# Patient Record
Sex: MALE | Race: WHITE | NOT HISPANIC OR LATINO | Employment: OTHER | URBAN - METROPOLITAN AREA
[De-identification: names, ages, dates, MRNs, and addresses within clinical notes are randomized per-mention and may not be internally consistent; named-entity substitution may affect disease eponyms.]

---

## 2024-02-06 ENCOUNTER — APPOINTMENT (OUTPATIENT)
Dept: PULMONOLOGY | Facility: CLINIC | Age: 69
End: 2024-02-06
Payer: COMMERCIAL

## 2024-03-12 NOTE — PROGRESS NOTES
Patient: Faisal Jose    [id number]  : 1955 -- AGE 68 y.o.    Provider: MELE Herman     Location [facility]   Service Date: 3/13/2024       Department of Medicine  Division of Pulmonary, Critical Care, and Sleep Medicine           [facility]  New Visit Note    HISTORY OF PRESENT ILLNESS     PCP: Dr. Trino Mckeon  Cardiology: Dr. Goldman ([facility]; [facility])    HISTORY OF PRESENT ILLNESS   Faisal oJse is a 68 y.o. male who presents to a [facility] for an evaluation with concerns of JURADO.  I have independently interviewed and examined the patient in the office and reviewed available records.    Current History    On today's visit, the patient reports no SOB at rest. Does have JURADO; noticeable going up stairs. Intermittent when walking on a flat surface. Symptoms began about 3 years ago. The other day, walked about 5 miles and felt pretty good. The day prior, the symptoms were more present. Symptoms seem to come/go. Does not personally have an albuterol HFA; but has been using his partners; however - has not tried it yet. Infrequent cough; typically dry. No recent fever, chills, sweats. No hemoptysis. States he has put on weight gradually; over the past 2 months feels like he's gained about 10 lbs. Questionable orthopnea. Lots of wheezing. Lots of lower leg swelling. States he did an echo at [facility] about 2 years ago. Cardiologist and PCP knows about the leg swelling. Was advised lasix in the past. States he used to be a lot more active in the past. Denies chronic runny nose. Takes prilosec daily; GERD typically well controlled. It can flare at times. The past week, has had 3 flares of GERD. Does not see GI and has never had EGD. Denies CP, palpitations. No inhalers therapy in the past.     Denies premature birth. No childhood pulmonary issues growing up. No pulmonary hospitalizations. Has never  been on home oxygen therapy before.    Has never completed a sleep study before. States he was advised to get one completed by his cardiologist. He does snore. Witnessed apneic events. No AM headaches. States he does not sleep well; maybe 4 hrs/day. Can doze off very easy.    CAT Today: 21  ACT Today: 14  ESS Today: 4    Prior Notes & History       REVIEW OF SYSTEMS     REVIEW OF SYSTEMS  Review of Systems    ALLERGIES AND MEDICATIONS     ALLERGIES  No Known Allergies    MEDICATIONS  Current Outpatient Medications   Medication Sig Dispense Refill    amLODIPine (Norvasc) 5 mg tablet Take 1 tablet (5 mg) by mouth once daily.      atorvastatin (Lipitor) 20 mg tablet Take 1 tablet (20 mg) by mouth once daily.      metoprolol succinate XL (Toprol-XL) 50 mg 24 hr tablet Take 1 tablet (50 mg) by mouth once daily.      omeprazole (PriLOSEC) 20 mg DR capsule Take 1 capsule (20 mg) by mouth once daily.       No current facility-administered medications for this visit.       PAST HISTORY     PAST MEDICAL HISTORY  He  has no past medical history on file.    PAST SURGICAL HISTORY  Past Surgical History:   Procedure Laterality Date    OTHER SURGICAL HISTORY  11/02/2021    Wrist surgery       IMMUNIZATION HISTORY    There is no immunization history on file for this patient.    SOCIAL HISTORY  He  reports that he quit smoking about 31 years ago. His smoking use included cigarettes. He started smoking about 55 years ago. He has a 52.50 pack-year smoking history. He has quit using smokeless tobacco. He reports that he does not currently use alcohol. He reports that he does not currently use drugs.     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 Worked in a mine (underground for 4.5 years, above ground 3 years).  Currently works as: retired 2019  DOES/DOES NOT: does not have known exposure to asbestos, silica, beryllium or inhaled metals.  DOES/DOES NOT:  "does not have exposure to birds or exotic animals.    FAMILY HISTORY  No family history on file.  DOES/DOES NOT: does have a family history of pulmonary disease.  Brother: COPD/COVID ()  Father: asthma  Brother: asthma  Mother: asthma  Son: asthma  Grandaughter: asthma  DOES/DOES NOT: does have a family history of cancer.  Mother; lung CA and esophageal CA  DOES/DOES NOT: does not have a family history of autoimmune disorders.    PHYSICAL EXAM     VITAL SIGNS: /68   Pulse 59   Ht 1.753 m (5' 9\")   Wt 127 kg (279 lb)   SpO2 95%   BMI 41.20 kg/m²      PREVIOUS WEIGHTS:  Wt Readings from Last 3 Encounters:   24 127 kg (279 lb)   23 121 kg (267 lb 12.8 oz)   23 122 kg (270 lb)       Physical Exam  Vitals reviewed.   Constitutional:       General: He is not in acute distress.     Appearance: Normal appearance. He is obese. He is not ill-appearing or toxic-appearing.   HENT:      Head: Normocephalic.      Nose: No rhinorrhea.   Cardiovascular:      Rate and Rhythm: Normal rate and regular rhythm.      Heart sounds: Normal heart sounds.   Pulmonary:      Effort: Pulmonary effort is normal. No respiratory distress.      Breath sounds: Normal breath sounds. No stridor. No wheezing, rhonchi or rales.      Comments: Generalized diminished lung sound all over; likely 2/2 to body habitus. No adventitious lung sounds noted  Abdominal:      General: Abdomen is flat.   Musculoskeletal:         General: Normal range of motion.      Right lower leg: Edema present.      Left lower leg: Edema present.      Comments: +2 pitting edema BLE   Skin:     General: Skin is warm and dry.      Nails: There is no clubbing.   Neurological:      General: No focal deficit present.      Mental Status: He is alert and oriented to person, place, and time.   Psychiatric:         Mood and Affect: Mood normal.         Behavior: Behavior normal.         Judgment: Judgment normal.         RESULTS/DATA     Pulmonary " "Function Test Results   PFT  - 12/4/23: ([facility]) FEV1/FVC: 79, FEV1: 2.51 (76%), FVC: 3.19 (73%), no BD response, [id number]: 87% (23% change after BD admin). No lung volumes. No diffusion capacity.    Chest Radiograph   CXR  - 12/4/23: IMPRESSION: No acute radiographic abnormality.     Chest CT Scan     No results found for this or any previous visit from the past 365 days.      Echocardiogram & Cardiac Studies     No results found for this or any previous visit from the past 365 days.       Labwork & Pathology   Complete Blood Count  No results found for: \"WBC\", \"HGB\", \"HCT\", \"MCV\", \"PLT\"    Peripheral Eosinophil Count:   No results found for: \"EOSABS\"    Serum Immunoglobulin E:    No results found for: \"IGE\"     Metabolic Parameters  No results found for: \"NA\", \"K\", \"CL\", \"CO2\", \"ANIONGAP\", \"BUN\", \"CREATININE\", \"GFRMALE\", \"GFRF\", \"GLUCOSE\", \"CALCIUM\", \"PHOS\", \"ZINC\", \"AST\", \"ALT\"    Bronchoscopy & Pathology/Cultures       ASSESSMENT/PLAN     Mr. Jose is a 68 y.o. male; was referred to the [facility] for evaluation of JURADO.    Problem List and Orders  Diagnoses and all orders for this visit:  Moderate persistent asthma without complication  -     fluticasone furoate-vilanteroL (Breo Ellipta) 100-25 mcg/dose inhaler; Inhale 1 puff once daily. Rinse mouth with water after use to reduce aftertaste and incidence of candidiasis. Do not swallow.  -     albuterol 90 mcg/actuation inhaler; Inhale 2 puffs every 4 hours if needed for wheezing or shortness of breath (You may also use this 10-15 minutes prior to exertional activity as needed).  Restrictive lung disease  -     CT chest wo IV contrast; Future  Former cigarette smoker  -     CT chest wo IV contrast; Future  Gastroesophageal reflux disease, unspecified whether esophagitis present  Suspected sleep apnea  -     Referral to Adult Sleep Medicine; Future      Assessment and Plan / Recommendations:  Asthma: Reports that his JURADO " symptoms began approximately 3 years ago.  Denies shortness of breath at rest but will experience JURADO with various exertional activities.  Symptoms seem to be intermittent and can come and go.  The other day he was able to go for a 5 mile walk in the woods and had no issues with the day previous could barely go for a short distance walk around his camper without feeling winded.  Has never had inhaler therapy before.  Recently completed a PFT study at [facility] on 12/4/2023; no clear obstruction seen but there was suggestion of restriction.  No positive bronchodilator response however his small airways were reactive after albuterol administration.  No lung volumes or diffusion capacity was done on this study.  He does admit to an intermittent dry cough and also experiences frequent wheezing.  -Start Breo 100; 1 inhalation once a day.  Rinse mouth after use.  -Start albuterol HFA as needed  -Very strong family history of asthma  -Between his presenting symptoms and PFT findings I am fairly confident that this is asthma.  Will see how he responds to the above treatments    2.  Lung restriction: PFT from 12/4/2023 was suggestive of a restrictive process however there were no lung volumes performed and no acute diffusion capacity was performed.  Patient is obese and has a BMI of 41.  His lung restriction is likely extrathoracic based on his body habitus however he also has a lots of occupational inhalant exposures over a long period of time.  -Ordering baseline chest CT to assess his lung anatomy    3.  Former smoker: Smoked anywhere from 2-2.5 PPD x21 years.  Quit in 1993.  -He does not qualify for lung cancer screening protocol given length of quit  -No chest CT ever on record  -Will assess for any nodules on the upcoming chest CT    4.  GERD: Has been taking omeprazole 20 mg daily for the past year and feels like it typically helps his acid reflux symptoms.  Lately he has been having an increased flaring of  symptoms.  Discussed dietary habits and lifestyle changes to help prevent increased flares.  -Continue omeprazole 20 mg daily; can also consider upping the dose to 40 mg a day (20/20 split)  -Has never seen a GI and has never had an EGD    5. Suspected BRENNAN: Has partner states that he snores excessively and has also witnessed multiple apneic events.  Patient does report daytime fatigue as well as dozing off easily throughout the day.  He does admit that he has poor sleep quality and will usually get maybe 4 hours of sleep at night.  -Referral to sleep medicine; Darlene Jackson CNP    RTC 3 months

## 2024-03-13 ENCOUNTER — OFFICE VISIT (OUTPATIENT)
Dept: PULMONOLOGY | Facility: CLINIC | Age: 69
End: 2024-03-13
Payer: COMMERCIAL

## 2024-03-13 VITALS
OXYGEN SATURATION: 95 % | HEIGHT: 69 IN | BODY MASS INDEX: 41.32 KG/M2 | DIASTOLIC BLOOD PRESSURE: 68 MMHG | HEART RATE: 59 BPM | WEIGHT: 279 LBS | SYSTOLIC BLOOD PRESSURE: 134 MMHG

## 2024-03-13 DIAGNOSIS — J45.40 MODERATE PERSISTENT ASTHMA WITHOUT COMPLICATION (HHS-HCC): Primary | ICD-10-CM

## 2024-03-13 DIAGNOSIS — K21.9 GASTROESOPHAGEAL REFLUX DISEASE, UNSPECIFIED WHETHER ESOPHAGITIS PRESENT: ICD-10-CM

## 2024-03-13 DIAGNOSIS — J98.4 RESTRICTIVE LUNG DISEASE: ICD-10-CM

## 2024-03-13 DIAGNOSIS — Z87.891 FORMER CIGARETTE SMOKER: ICD-10-CM

## 2024-03-13 DIAGNOSIS — R29.818 SUSPECTED SLEEP APNEA: ICD-10-CM

## 2024-03-13 PROCEDURE — 1126F AMNT PAIN NOTED NONE PRSNT: CPT | Performed by: NURSE PRACTITIONER

## 2024-03-13 PROCEDURE — 1159F MED LIST DOCD IN RCRD: CPT | Performed by: NURSE PRACTITIONER

## 2024-03-13 PROCEDURE — 99214 OFFICE O/P EST MOD 30 MIN: CPT | Performed by: NURSE PRACTITIONER

## 2024-03-13 PROCEDURE — 3008F BODY MASS INDEX DOCD: CPT | Performed by: NURSE PRACTITIONER

## 2024-03-13 PROCEDURE — 99204 OFFICE O/P NEW MOD 45 MIN: CPT | Performed by: NURSE PRACTITIONER

## 2024-03-13 PROCEDURE — 1160F RVW MEDS BY RX/DR IN RCRD: CPT | Performed by: NURSE PRACTITIONER

## 2024-03-13 PROCEDURE — 1036F TOBACCO NON-USER: CPT | Performed by: NURSE PRACTITIONER

## 2024-03-13 RX ORDER — ATORVASTATIN CALCIUM 20 MG/1
20 TABLET, FILM COATED ORAL DAILY
COMMUNITY

## 2024-03-13 RX ORDER — OMEPRAZOLE 20 MG/1
20 CAPSULE, DELAYED RELEASE ORAL
COMMUNITY

## 2024-03-13 RX ORDER — AMLODIPINE BESYLATE 5 MG/1
5 TABLET ORAL
COMMUNITY
Start: 2023-10-03

## 2024-03-13 RX ORDER — METOPROLOL SUCCINATE 50 MG/1
50 TABLET, EXTENDED RELEASE ORAL DAILY
COMMUNITY

## 2024-03-13 RX ORDER — FLUTICASONE FUROATE AND VILANTEROL 100; 25 UG/1; UG/1
1 POWDER RESPIRATORY (INHALATION) DAILY
Qty: 1 EACH | Refills: 3 | Status: SHIPPED | OUTPATIENT
Start: 2024-03-13

## 2024-03-13 RX ORDER — ALBUTEROL SULFATE 90 UG/1
2 AEROSOL, METERED RESPIRATORY (INHALATION) EVERY 4 HOURS PRN
Qty: 18 G | Refills: 5 | Status: SHIPPED | OUTPATIENT
Start: 2024-03-13

## 2024-03-13 ASSESSMENT — PATIENT HEALTH QUESTIONNAIRE - PHQ9
2. FEELING DOWN, DEPRESSED OR HOPELESS: NOT AT ALL
SUM OF ALL RESPONSES TO PHQ9 QUESTIONS 1 & 2: 0
1. LITTLE INTEREST OR PLEASURE IN DOING THINGS: NOT AT ALL

## 2024-03-13 ASSESSMENT — PAIN SCALES - GENERAL: PAINLEVEL: 0-NO PAIN

## 2024-03-13 ASSESSMENT — LIFESTYLE VARIABLES: HOW MANY STANDARD DRINKS CONTAINING ALCOHOL DO YOU HAVE ON A TYPICAL DAY: PATIENT DOES NOT DRINK

## 2024-03-14 PROBLEM — M25.532 LEFT WRIST PAIN: Status: RESOLVED | Noted: 2024-03-14 | Resolved: 2024-03-14

## 2024-03-14 PROBLEM — E16.2 HYPOGLYCEMIA: Status: RESOLVED | Noted: 2024-03-14 | Resolved: 2024-03-14

## 2024-03-14 PROBLEM — E78.5 DYSLIPIDEMIA: Status: RESOLVED | Noted: 2021-12-14 | Resolved: 2024-03-14

## 2024-03-14 PROBLEM — E78.2 MIXED HYPERLIPIDEMIA: Status: RESOLVED | Noted: 2024-03-14 | Resolved: 2024-03-14

## 2024-03-14 PROBLEM — R03.0 FINDING OF ABOVE NORMAL BLOOD PRESSURE: Status: RESOLVED | Noted: 2024-03-14 | Resolved: 2024-03-14

## 2024-03-14 PROBLEM — E66.812 OBESITY, CLASS II, BMI 35-39.9: Status: RESOLVED | Noted: 2021-12-14 | Resolved: 2024-03-14

## 2024-03-14 PROBLEM — E66.01 MORBID (SEVERE) OBESITY DUE TO EXCESS CALORIES (MULTI): Status: RESOLVED | Noted: 2024-03-14 | Resolved: 2024-03-14

## 2024-03-14 PROBLEM — I10 PRIMARY HYPERTENSION: Status: RESOLVED | Noted: 2022-08-16 | Resolved: 2024-03-14

## 2024-03-14 PROBLEM — M25.519 SHOULDER PAIN: Status: RESOLVED | Noted: 2024-03-14 | Resolved: 2024-03-14

## 2024-03-14 PROBLEM — M67.439 GANGLION CYST OF WRIST: Status: RESOLVED | Noted: 2024-03-14 | Resolved: 2024-03-14

## 2024-03-14 PROBLEM — E66.9 OBESITY, CLASS II, BMI 35-39.9: Status: RESOLVED | Noted: 2021-12-14 | Resolved: 2024-03-14

## 2024-03-14 PROBLEM — G43.109 MIGRAINE WITH AURA AND WITHOUT STATUS MIGRAINOSUS, NOT INTRACTABLE: Status: RESOLVED | Noted: 2024-03-14 | Resolved: 2024-03-14

## 2024-03-14 PROBLEM — R73.03 PREDIABETES: Status: RESOLVED | Noted: 2021-12-14 | Resolved: 2024-03-14

## 2024-03-14 RX ORDER — FUROSEMIDE 20 MG/1
20 TABLET ORAL
COMMUNITY
Start: 2023-05-23 | End: 2024-05-01 | Stop reason: ALTCHOICE

## 2024-03-26 ENCOUNTER — APPOINTMENT (OUTPATIENT)
Dept: SLEEP MEDICINE | Facility: CLINIC | Age: 69
End: 2024-03-26
Payer: COMMERCIAL

## 2024-03-26 ENCOUNTER — HOSPITAL ENCOUNTER (OUTPATIENT)
Dept: RADIOLOGY | Facility: HOSPITAL | Age: 69
Discharge: HOME | End: 2024-03-26
Payer: COMMERCIAL

## 2024-03-26 DIAGNOSIS — Z87.891 FORMER CIGARETTE SMOKER: ICD-10-CM

## 2024-03-26 DIAGNOSIS — J98.4 RESTRICTIVE LUNG DISEASE: ICD-10-CM

## 2024-03-26 PROCEDURE — 71250 CT THORAX DX C-: CPT | Performed by: STUDENT IN AN ORGANIZED HEALTH CARE EDUCATION/TRAINING PROGRAM

## 2024-03-26 PROCEDURE — 71250 CT THORAX DX C-: CPT

## 2024-03-27 ENCOUNTER — OFFICE VISIT (OUTPATIENT)
Dept: SLEEP MEDICINE | Facility: CLINIC | Age: 69
End: 2024-03-27
Payer: COMMERCIAL

## 2024-03-27 VITALS
BODY MASS INDEX: 41.05 KG/M2 | OXYGEN SATURATION: 91 % | SYSTOLIC BLOOD PRESSURE: 121 MMHG | HEART RATE: 52 BPM | WEIGHT: 278 LBS | DIASTOLIC BLOOD PRESSURE: 78 MMHG

## 2024-03-27 DIAGNOSIS — Z87.891 FORMER CIGARETTE SMOKER: ICD-10-CM

## 2024-03-27 DIAGNOSIS — G47.9 SLEEP DISTURBANCES: ICD-10-CM

## 2024-03-27 DIAGNOSIS — G47.30 SLEEP-DISORDERED BREATHING: Primary | ICD-10-CM

## 2024-03-27 DIAGNOSIS — R29.818 SUSPECTED SLEEP APNEA: ICD-10-CM

## 2024-03-27 DIAGNOSIS — E66.01 MORBID OBESITY (MULTI): ICD-10-CM

## 2024-03-27 DIAGNOSIS — Z72.821 POOR SLEEP HYGIENE: ICD-10-CM

## 2024-03-27 DIAGNOSIS — Z13.31 NEGATIVE DEPRESSION SCREENING: ICD-10-CM

## 2024-03-27 DIAGNOSIS — R53.83 FATIGUE, UNSPECIFIED TYPE: ICD-10-CM

## 2024-03-27 DIAGNOSIS — R06.83 SNORING: ICD-10-CM

## 2024-03-27 PROCEDURE — 1159F MED LIST DOCD IN RCRD: CPT

## 2024-03-27 PROCEDURE — 99204 OFFICE O/P NEW MOD 45 MIN: CPT

## 2024-03-27 PROCEDURE — 1036F TOBACCO NON-USER: CPT

## 2024-03-27 PROCEDURE — 1160F RVW MEDS BY RX/DR IN RCRD: CPT

## 2024-03-27 PROCEDURE — 3008F BODY MASS INDEX DOCD: CPT

## 2024-03-27 ASSESSMENT — SLEEP AND FATIGUE QUESTIONNAIRES
HOW LIKELY ARE YOU TO NOD OFF OR FALL ASLEEP IN A CAR, WHILE STOPPED FOR A FEW MINUTES IN TRAFFIC: WOULD NEVER DOZE
WORRIED_DISTRESSED_DUE_TO_SLEEP: NOT AT ALL NOTICEABLE
SITING INACTIVE IN A PUBLIC PLACE LIKE A CLASS ROOM OR A MOVIE THEATER: WOULD NEVER DOZE
ESS-CHAD TOTAL SCORE: 3
SATISFACTION_WITH_CURRENT_SLEEP_PATTERN: SATISFIED
HOW LIKELY ARE YOU TO NOD OFF OR FALL ASLEEP WHILE WATCHING TV: MODERATE CHANCE OF DOZING
HOW LIKELY ARE YOU TO NOD OFF OR FALL ASLEEP WHILE LYING DOWN TO REST IN THE AFTERNOON WHEN CIRCUMSTANCES PERMIT: WOULD NEVER DOZE
DIFFICULTY_FALLING_ASLEEP: MODERATE
SLEEP_PROBLEM_NOTICEABLE_TO_OTHERS: A LITTLE
SLEEP_PROBLEM_INTERFERES_DAILY_ACTIVITIES: A LITTLE
HOW LIKELY ARE YOU TO NOD OFF OR FALL ASLEEP WHILE SITTING AND READING: WOULD NEVER DOZE
DIFFICULTY_STAYING_ASLEEP: MODERATE
HOW LIKELY ARE YOU TO NOD OFF OR FALL ASLEEP WHEN YOU ARE A PASSENGER IN A CAR FOR AN HOUR WITHOUT A BREAK: WOULD NEVER DOZE
WAKING_TOO_EARLY: MODERATE
HOW LIKELY ARE YOU TO NOD OFF OR FALL ASLEEP WHILE SITTING QUIETLY AFTER LUNCH WITHOUT ALCOHOL: SLIGHT CHANCE OF DOZING
HOW LIKELY ARE YOU TO NOD OFF OR FALL ASLEEP WHILE SITTING AND TALKING TO SOMEONE: WOULD NEVER DOZE

## 2024-03-27 ASSESSMENT — ANXIETY QUESTIONNAIRES
5. BEING SO RESTLESS THAT IT IS HARD TO SIT STILL: NOT AT ALL
4. TROUBLE RELAXING: NOT AT ALL
1. FEELING NERVOUS, ANXIOUS, OR ON EDGE: NOT AT ALL
3. WORRYING TOO MUCH ABOUT DIFFERENT THINGS: NOT AT ALL
GAD7 TOTAL SCORE: 0
7. FEELING AFRAID AS IF SOMETHING AWFUL MIGHT HAPPEN: NOT AT ALL
6. BECOMING EASILY ANNOYED OR IRRITABLE: NOT AT ALL
2. NOT BEING ABLE TO STOP OR CONTROL WORRYING: NOT AT ALL

## 2024-03-27 ASSESSMENT — PATIENT HEALTH QUESTIONNAIRE - PHQ9
2. FEELING DOWN, DEPRESSED OR HOPELESS: NOT AT ALL
1. LITTLE INTEREST OR PLEASURE IN DOING THINGS: NOT AT ALL
SUM OF ALL RESPONSES TO PHQ9 QUESTIONS 1 AND 2: 0

## 2024-03-27 NOTE — PATIENT INSTRUCTIONS
It was a pleasure meeting you today Faisal Jose     As we discussed today in clinic:    1. Do in-home sleep testing.    2. Encouraged to lose weight.   3. Remember, don't drive when sleepy.   4. Follow-up with Cardiology and Pulmonary for chronic SOB  5. Think about weight loss management options    Education handouts given: Sleep Study Information    Please follow-up in 4 - 6 weeks after testing    ALWAYS BRING YOUR CPAP / BIPAP WITH YOU TO EVERY APPOINTMENT!  THANKS    FOR QUESTIONS AND CONCERNS:   1. In case of problems with machine or mask interface, please contact your DME company first. [employer] is the company that provides you the machine and/or CPAP supplies. If [employer] if your DME, you can reach them at [phone] or [employer] ([employer]) [phone].  2. For SLEEP STUDY appointments, please call [phone] ([employer]) or [phone] / [phone] ([facility]) or any other  Sleep Lab location please call [phone]  3. For MEDICAL QUESTIONS, MEDICATION REFILLS, or CLINIC APPOINTMENT SCHEDULING, please call [phone] and my practice lead Susie would gladly assist you with any concerns.   4. In the event that you are running more than 10 minutes late to your appointment or 5 minutes to virtual, I will kindly ask you to reschedule.    Here at [facility], we wish you a restful sleep!

## 2024-03-27 NOTE — LETTER
Your Provider has ordered you a sleep study.  The process for a home sleep study is as follows:    HOME SLEEP STUDY    Your test was ordered today. It will usually take 2 - 3 business days for Insurance to approve the order.     Once you test is approved it will be sent to the ordering sleep lab. When the sleep lab is notified of the new order, they will reach out to you to get you scheduled for a pick-up date for your Home Sleep Study Kit or notify you of when it will be mailed (CLEVMED).     They usually will reach out to you about 1 week after your test is ordered. Please contact the office at [phone] if you have not heard back from them in 2 weeks after you have seen your provider. See below for list of sleep lab contact numbers, if needed.     Sleep Lab Contact Information:   Main Phone Line (scheduling only): [phone] ([phone]), option 3  Adult and Pediatric Locations  [facility] (6 years and older): [facility] - 4th floor ([facility]) After hours line: [phone]  [facility] (Main campus: All ages): [facility], 6th floor. After hours line: [phone]   [place] (5 years and older; younger considered on case-by-case basis): [address]; [address], [address]. Scheduling  After hours line: [phone]   [place] (6 years and older): [address]; [address]; [address]   [place] (6 years and older): [address] [facility], [address]  After hours line: [phone]   [place] (13 years and older) in [place]: [address], 2nd floor  After hours line: [phone]   [place] (13 year and older): [address]  After hours line: [phone]     Adult Only Locations:   [place] (18 years and older): [address] [facility], 2nd [address]   [place] (18 years and older): [address] [facility]; 4th floor  After hours line: [phone]  [facility] (18 years and older) at  [place]: [address]  After hours line: [phone]

## 2024-03-27 NOTE — PROGRESS NOTES
Patient: Faisal Jose  : 1955 AGE: 68 y.o. SEX:male   MRN: [id number]   Provider: [facility]     Location [facility]   Service Date: 3/27/2024     PCP: No primary care provider on file.   Referred by: Derik Moise APRN-*            [facility]  NEW PATIENT VISIT NOTE      PATIENT INFORMATION     The patient's referring provider is: Derik Moise APRN-*  The patient's Primary Care Provider: No primary care provider on file.    HISTORY OF PRESENT ILLNESS     Patient ID: Faisal Jose is a 68 y.o. male who presents to a [facility] for evaluation for evaluation for sleep apnea    Patient is here accompanied by wife who adds to history.  The patient has pertinent hx of sleep disordered breathing, sleep disturbance, difficulty falling asleep, difficulty staying asleep, snoring, EDS, fatigue, obesity, HTN, HLD, Asthma, restrictive lung disease, former smoker, GERD, pre-diabetes, migraines, and chronic pain.     24  Patient here today with his wife for evaluation for BRENNAN. He reports that he has always had bad sleep his whole life. 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 He reports that he sleep maybe 4 - 5 hrs per night. Sleep schedule is variable, never sleeping around the same time. Does take naps, unrefreshed by naps. He and his wife report he snores. He reports no issue falling asleep but staying asleep is the issue. He usually will wake up 1 - 2 times per night and struggles to go back to sleep, usually he will be up for the day if he awakens at night.   I discussed testing options today with the patient today, HSAT vs In-lab. HSAT is reasonable as patient likely has BRENNAN based on history and exam and does not have any of the following comorbidities: Afib, CHF, seizures, neuromuscular weakness,  hypoventilation, or significant COPD.   He was referred from pulmonary for chronic SOB. He reports that he walk without being SOB. Usually only occurs with exertion. Weight is steadily increasing d/t exercise intolerance from his SOB. Encouraged follow-up with both cardiology and pulmonary.   Discussed weight loss treatment options, including injectable and pills. He is not interested at this. Encouraged weight management as this can be exacerbating his BRENNAN.   BP is WNL  Negative screens      SLEEP HISTORY     SLEEP-WAKE SCHEDULE  Bedtime: variable  Wake time: variable    SLEEP HABITS   Smoking: former - quit   ETOH: denied  Marijuana: denied  Caffeine: daily  Sleep aids: denied    WEIGHT: gained       Claustrophobia: No     STOP-BAN  ESS: 3   KIMBERLEY: 10  SAPNA-7: 0  PHQ-2:  NEGATIVE SCREEN      REVIEW OF SYSTEMS     REVIEW OF SYSTEMS  Sleep-related ROS:  Night symptoms: POSITIVE for snoring, wake up gasping and/or choking for air, nasal congestion , mouth breathing, waking up with racing heart, heartburn or sour taste in mouth at night, and nocturnal cough and NEGATIVE for witnessed apnea and night sweats during sleep  Morning symptoms: POSITIVE for unrefreshing sleep and morning dry mouth and NEGATIVE for morning headache and morning sore throat  Daytime symptoms POSITIVE for fatigue and NEGATIVE for excessive daytime sleepiness, trouble remembering things in daytime, trouble staying focused in daytime, irritability in daytime, and drowsy driving  Hypersomnia / narcolepsy symptoms: Patient denies symptoms of a hypersomnolence disorder such as sleep paralysis, sleep-related hallucinations, recurrent sleep attacks, automatic behaviors, and cataplexy.   Parasomnia symptoms: Patient denies symptoms of parasomnia.  Movements in sleep: Patient denies problematic movements in sleep.    RLS screen:  RLSSCREEN: - Sensations: Patient does not have unusual sensations in their extremities that cause an urge to move them      Naps:   Yes. Naps ARE/ARENOT: are refreshing.  Fatigue: struggles to carry out day to day responsibilities.    Review of Systems   Constitutional:  Positive for fatigue.   Respiratory:  Positive for shortness of breath.        All other systems have been reviewed and are negative.      ALLERGIES AND MEDICATIONS     ALLERGIES  No Known Allergies    MEDICATIONS  Current Outpatient Medications   Medication Sig Dispense Refill    albuterol 90 mcg/actuation inhaler Inhale 2 puffs every 4 hours if needed for wheezing or shortness of breath (You may also use this 10-15 minutes prior to exertional activity as needed). 18 g 5    amLODIPine (Norvasc) 5 mg tablet Take 1 tablet (5 mg) by mouth once daily.      atorvastatin (Lipitor) 20 mg tablet Take 1 tablet (20 mg) by mouth once daily.      fluticasone furoate-vilanteroL (Breo Ellipta) 100-25 mcg/dose inhaler Inhale 1 puff once daily. Rinse mouth with water after use to reduce aftertaste and incidence of candidiasis. Do not swallow. 1 each 3    furosemide (Lasix) 20 mg tablet Take 1 tablet (20 mg) by mouth every other day.      metoprolol succinate XL (Toprol-XL) 50 mg 24 hr tablet Take 1 tablet (50 mg) by mouth once daily.      omeprazole (PriLOSEC) 20 mg DR capsule Take 1 capsule (20 mg) by mouth once daily.       No current facility-administered medications for this visit.         PAST HISTORY     PERTINENT PAST MEDICAL HISTORY: See HPI    PERTINENT PAST SURGICAL HISTORY for Sleep Medicine:  non-contributory    PERTINENT FAMILY HISTORY for Sleep Medicine:  Patient denies family history of any sleep disorder.  Patient denies family history of sleep apnea.    PERTINENT SOCIAL HISTORY:  He  reports that he quit smoking about 31 years ago. His smoking use included cigarettes. He started smoking about 55 years ago. He has a 52.50 pack-year smoking history. He has quit using smokeless tobacco. He reports that he does not currently use alcohol. He reports that he does not  "currently use drugs. He currently lives with family and retired    Active Problems, Allergy List, Medication List, and PMH/PSH/FH/Social Hx have been reviewed and reconciled in chart. No significant changes unless documented in the pertinent chart section. Updates made when necessary.       PHYSICAL EXAM     VITAL SIGNS: /78   Pulse 52   Wt 126 kg (278 lb)   SpO2 91%   BMI 41.05 kg/m²     CURRENT WEIGHT:   Vitals:    03/27/24 0943   Weight: 126 kg (278 lb)      BMI: [unfilled]    PREVIOUS WEIGHTS:  Wt Readings from Last 3 Encounters:   03/27/24 126 kg (278 lb)   03/13/24 127 kg (279 lb)   07/28/23 121 kg (267 lb 12.8 oz)       Physical Exam  Constitutional: Awake, not in distress  Lungs: Clear to auscultation bilateral, no cough noted  Heart: Regular rate and rhythm  Skin: Warm, no rash  Neuro: No tremors, moves all extremities  Psych: alert and oriented to time, place, and person        RESULTS/DATA     No results found for: \"IRON\", \"TRANSFERRIN\", \"IRONSAT\", \"TIBC\", \"FERRITIN\"    No results found for: \"CO2\"    PAP Adherence  Not applicable      ASSESSMENT/PLAN     Assessment/Plan   Faisal Jose is a 68 y.o. male presents today in [facility] with the following problems:    SLEEP DISORDERED BREATHING/SUSPECTED SLEEP APNEA and SNORING,   - Patient's risk factors for BRENNAN: BMI, age, neck circumference, gender, HTN, asthma, and narrow crowded upper airway anatomy  - Current symptoms are: see HPI  - We discussed testing options today in clinic, HSAT vs In-lab  - HSAT is reasonable as patient likely has BRENNAN based on history and exam and does not have any of the following comorbidities: Afib, CHF, seizures, neuromuscular weakness, hypoventilation, or significant COPD.  - Discussed BRENNAN pathophysiology, diagnostic testing (HST vs PSG), cardiometabolic and neurocognitive sequelae of untreated BRENNAN, and treatment options (PAP therapy, oral appliance, surgery, hypoglossal nerve " stimulator called INSPIRE, etc).      POOR SLEEP HYGIENE / EXCESSIVE DAYTIME SLEEPINESS (EDS) / FATIGUE  + SLEEP DISTURBANCES + HYPERSOMNIA  - due to combination of poor sleep hygiene, anxiety, untreated sleep apnea, chronic pain, irregular sleep schedule and environment. Per review of medication list, it does NOT appear medications are a contributing factor.  - discussed with patient good sleep hygiene   - Important to get good daily dose of natural sunlight to help wakefulness & energy  - Reduce artificial lighting at night, especially light from screens (i.e. cellphones, TV, tablets)  - Exercise is helpful for your mental and physical health  - Have a consistent bedtime routine 1 hours prior to your usual bedtime  - If going to take a nap, it should be less than 30 minutes and prior to 3 pm  - Limit alcohol and caffeine use   - Avoidance of marijuana and/or CBD use  - will reevaluate after successful testing and treatment of BRENNAN      MORBID OBESITY  - BMI today Body mass index is 41.05 kg/m².   - no significant weight changes noted or reported  - Encouraged patient to lose weight with diet and exercise.   - Weight loss can help in the long term treatment of BRENNAN.  - Declined weight loss management assistance consult  - Defer management to PCP    HYPERTENSION  - BP today 121/78  - well controlled with medication, denies any issues with management   - encouraged daily exercise with healthy diet for BP and BRENNAN management  - Defer management to PCP    DEPRESSION / ANXIETY screen  - NEGATIVE SCREEN today 03/27/24      SMOKING STATUS screen  - former smoker     All of patient's questions were answered. He verbalizes understanding and agreement with my assessment and plan.

## 2024-03-29 PROBLEM — E66.01 MORBID OBESITY (MULTI): Status: ACTIVE | Noted: 2024-03-29

## 2024-03-29 PROBLEM — Z13.31 NEGATIVE DEPRESSION SCREENING: Status: ACTIVE | Noted: 2024-03-29

## 2024-03-29 PROBLEM — G47.30 SLEEP-DISORDERED BREATHING: Status: ACTIVE | Noted: 2024-03-29

## 2024-03-29 PROBLEM — G47.9 SLEEP DISTURBANCES: Status: ACTIVE | Noted: 2024-03-29

## 2024-03-29 PROBLEM — R06.83 SNORING: Status: ACTIVE | Noted: 2024-03-29

## 2024-03-29 PROBLEM — Z72.821 POOR SLEEP HYGIENE: Status: ACTIVE | Noted: 2024-03-29

## 2024-03-29 PROBLEM — R53.83 FATIGUE: Status: ACTIVE | Noted: 2024-03-29

## 2024-03-29 ASSESSMENT — ENCOUNTER SYMPTOMS
FATIGUE: 1
SHORTNESS OF BREATH: 1

## 2024-05-01 ENCOUNTER — OFFICE VISIT (OUTPATIENT)
Dept: SLEEP MEDICINE | Facility: CLINIC | Age: 69
End: 2024-05-01
Payer: COMMERCIAL

## 2024-05-01 VITALS
DIASTOLIC BLOOD PRESSURE: 72 MMHG | WEIGHT: 277.2 LBS | BODY MASS INDEX: 40.94 KG/M2 | HEART RATE: 48 BPM | SYSTOLIC BLOOD PRESSURE: 123 MMHG | OXYGEN SATURATION: 92 %

## 2024-05-01 DIAGNOSIS — R06.83 SNORING: ICD-10-CM

## 2024-05-01 DIAGNOSIS — R45.89 SYMPTOMS OF DEPRESSION: ICD-10-CM

## 2024-05-01 DIAGNOSIS — E66.01 MORBID OBESITY (MULTI): ICD-10-CM

## 2024-05-01 DIAGNOSIS — Z72.821 POOR SLEEP HYGIENE: ICD-10-CM

## 2024-05-01 DIAGNOSIS — R29.818 SUSPECTED SLEEP APNEA: Primary | ICD-10-CM

## 2024-05-01 DIAGNOSIS — G47.9 SLEEP DISTURBANCES: ICD-10-CM

## 2024-05-01 DIAGNOSIS — G47.30 SLEEP-DISORDERED BREATHING: ICD-10-CM

## 2024-05-01 DIAGNOSIS — F41.9 ANXIETY: ICD-10-CM

## 2024-05-01 DIAGNOSIS — R53.83 FATIGUE, UNSPECIFIED TYPE: ICD-10-CM

## 2024-05-01 DIAGNOSIS — Z87.891 FORMER SMOKER: ICD-10-CM

## 2024-05-01 PROCEDURE — 1159F MED LIST DOCD IN RCRD: CPT

## 2024-05-01 PROCEDURE — 99214 OFFICE O/P EST MOD 30 MIN: CPT

## 2024-05-01 PROCEDURE — 1160F RVW MEDS BY RX/DR IN RCRD: CPT

## 2024-05-01 PROCEDURE — 3008F BODY MASS INDEX DOCD: CPT

## 2024-05-01 ASSESSMENT — ENCOUNTER SYMPTOMS
INSOMNIA: 1
HOPELESSNESS: 1
DIFFICULTY WITH CONCENTRATION: 1
FATIGUE: 1
PREMATURE MORNING AWAKENING: 1
EXCESSIVE DAYTIME SLEEPINESS: 1
HYPERSOMNIA: 1

## 2024-05-01 ASSESSMENT — SLEEP AND FATIGUE QUESTIONNAIRES
HOW LIKELY ARE YOU TO NOD OFF OR FALL ASLEEP WHILE SITTING QUIETLY AFTER LUNCH WITHOUT ALCOHOL: WOULD NEVER DOZE
ESS-CHAD TOTAL SCORE: 5
HOW LIKELY ARE YOU TO NOD OFF OR FALL ASLEEP IN A CAR, WHILE STOPPED FOR A FEW MINUTES IN TRAFFIC: WOULD NEVER DOZE
SATISFACTION_WITH_CURRENT_SLEEP_PATTERN: SATISFIED
HOW LIKELY ARE YOU TO NOD OFF OR FALL ASLEEP WHEN YOU ARE A PASSENGER IN A CAR FOR AN HOUR WITHOUT A BREAK: WOULD NEVER DOZE
WORRIED_DISTRESSED_DUE_TO_SLEEP: NOT AT ALL NOTICEABLE
HOW LIKELY ARE YOU TO NOD OFF OR FALL ASLEEP WHILE LYING DOWN TO REST IN THE AFTERNOON WHEN CIRCUMSTANCES PERMIT: WOULD NEVER DOZE
DIFFICULTY_STAYING_ASLEEP: SEVERE
HOW LIKELY ARE YOU TO NOD OFF OR FALL ASLEEP WHILE WATCHING TV: HIGH CHANCE OF DOZING
SITING INACTIVE IN A PUBLIC PLACE LIKE A CLASS ROOM OR A MOVIE THEATER: WOULD NEVER DOZE
HOW LIKELY ARE YOU TO NOD OFF OR FALL ASLEEP WHILE SITTING AND TALKING TO SOMEONE: WOULD NEVER DOZE
HOW LIKELY ARE YOU TO NOD OFF OR FALL ASLEEP WHILE SITTING AND READING: MODERATE CHANCE OF DOZING
SLEEP_PROBLEM_INTERFERES_DAILY_ACTIVITIES: NOT AT ALL NOTICEABLE
WAKING_TOO_EARLY: VERY SEVERE
SLEEP_PROBLEM_NOTICEABLE_TO_OTHERS: A LITTLE

## 2024-05-01 ASSESSMENT — PATIENT HEALTH QUESTIONNAIRE - PHQ9
SUM OF ALL RESPONSES TO PHQ9 QUESTIONS 1 AND 2: 2
1. LITTLE INTEREST OR PLEASURE IN DOING THINGS: SEVERAL DAYS
2. FEELING DOWN, DEPRESSED OR HOPELESS: SEVERAL DAYS

## 2024-05-01 ASSESSMENT — ANXIETY QUESTIONNAIRES
7. FEELING AFRAID AS IF SOMETHING AWFUL MIGHT HAPPEN: NOT AT ALL
6. BECOMING EASILY ANNOYED OR IRRITABLE: NEARLY EVERY DAY
5. BEING SO RESTLESS THAT IT IS HARD TO SIT STILL: SEVERAL DAYS
4. TROUBLE RELAXING: MORE THAN HALF THE DAYS
3. WORRYING TOO MUCH ABOUT DIFFERENT THINGS: NOT AT ALL
2. NOT BEING ABLE TO STOP OR CONTROL WORRYING: NOT AT ALL
1. FEELING NERVOUS, ANXIOUS, OR ON EDGE: NOT AT ALL
GAD7 TOTAL SCORE: 6

## 2024-05-01 NOTE — PROGRESS NOTES
Patient: Faisal Jose  : 1955 AGE: 68 y.o. SEX:male   MRN: [id number]   Provider: [facility]IN[facility]     Location [facility]   Service Date: 2024     PCP: Trino Mckeon MD   Referred by:               [facility]  FOLLOW-UP VISIT NOTE        HISTORY OF PRESENT ILLNESS     Patient ID: Faisal Jose is a 68 y.o. male who presents to a [facility] for follow-up evaluation for sleep apnea, Anxiety, and Depression    Patient is here accompanied by wife who adds to history.  The patient has pertinent hx of sleep disordered breathing, sleep disturbance, difficulty falling asleep, difficulty staying asleep, snoring, inadequate sleep hygiene, EDS, fatigue, morbid obesity, HTN, HLD, Asthma, former smoker, restrictive lung disease, GERD, pre-diabetes, migraines, and chronic pain    Previous Visit's:  24  Patient here today with his wife for evaluation for BRENNAN. He reports that he has always had bad sleep his whole life. 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 He reports that he sleep maybe 4 - 5 hrs per night. Sleep schedule is variable, never sleeping around the same time. Does take naps, unrefreshed by naps. He and his wife report he snores. He reports no issue falling asleep but staying asleep is the issue. He usually will wake up 1 - 2 times per night and struggles to go back to sleep, usually he will be up for the day if he awakens at night.   I discussed testing options today with the patient today, HSAT vs In-lab. HSAT is reasonable as patient likely has BRENNAN based on history and exam and does not have any of the following comorbidities: Afib, CHF, seizures, neuromuscular weakness, hypoventilation, or significant COPD.   He was referred from pulmonary for chronic SOB. He reports that he walk without being  SOB. Usually only occurs with exertion. Weight is steadily increasing d/t exercise intolerance from his SOB. Encouraged follow-up with both cardiology and pulmonary.   Discussed weight loss treatment options, including injectable and pills. He is not interested at this. Encouraged weight management as this can be exacerbating his BRENNAN.   BP is WNL  Negative screens      Interval History  Patient was last seen in 3/2024.     05/01/24   Patient has not completed his sleep study yet. It is scheduled for May 13th. He was scheduled today for follow-up to review sleep study results. I discussed that once he completes his sleep study, if positive for moderate or severe BRENNAN, we will start CPAP therapy. If mild, we can look at alternative therapies. We will discuss treatment plan after we have test results.   He reports that he is having a bad week. He unfortunately found his son passed away on Sunday. He struggling with loss. I offered grief counseling, therapy or psych referral. He is not interested. He reports that sleep is disrupted currently as he is trying to cope with this sudden loss. He denied any SI or HI or hallucinations. I encouraged patient to establish with mental health, as he is struggling overall with multiple deaths of family members and friends from advancing age. He is not interested in any services. I discussed the importance of sleep and maintaining a good consistent schedule in the interim.   Weight is unchanged, not interested in weight management.   BP is WNL, wells controlled with medications  + MH screens (see above)      SLEEP HISTORY     SLEEP STUDY HISTORY  Awaiting results, scheduled for testing on May 13th    SLEEP-WAKE SCHEDULE  Bedtime: 11 pm  Wake time: 3 am    SLEEP HABITS   Smoking: former  ETOH: DENIED  Marijuana: DENIED  Caffeine:  YES  Sleep aids: denied     WEIGHT: stable    REVIEW OF SYSTEMS     REVIEW OF SYSTEMS  SLEEP ROS   Review of Systems   Constitutional:  Positive for fatigue.    Neurological:  Positive for difficulty with concentration and excessive daytime sleepiness.   Psychiatric/Behavioral:  The patient has insomnia.      Psych Review of Symptoms:    Anxiety:   Generalized Anxiety Symptoms: Excessive worry, difficulty with concentration, physiological symptoms of anxiety and sleep disturbances due to anxiety.       Depressive Symptoms:   Decreased interest, fatigue, hypersomnia, irritable, hopelessness and insomnia. No suicidal ideation.      Sleep Concerns:   Excessive daytime sleepiness, difficulty staying asleep, awakening from sleep, difficulty falling asleep and premature morning awakening.        All other systems have been reviewed and are negative.      ALLERGIES     No Known Allergies    MEDICATIONS     Current Outpatient Medications   Medication Sig Dispense Refill    albuterol 90 mcg/actuation inhaler Inhale 2 puffs every 4 hours if needed for wheezing or shortness of breath (You may also use this 10-15 minutes prior to exertional activity as needed). 18 g 5    amLODIPine (Norvasc) 5 mg tablet Take 1 tablet (5 mg) by mouth once daily.      atorvastatin (Lipitor) 20 mg tablet Take 1 tablet (20 mg) by mouth once daily.      fluticasone furoate-vilanteroL (Breo Ellipta) 100-25 mcg/dose inhaler Inhale 1 puff once daily. Rinse mouth with water after use to reduce aftertaste and incidence of candidiasis. Do not swallow. 1 each 3    metoprolol succinate XL (Toprol-XL) 50 mg 24 hr tablet Take 1 tablet (50 mg) by mouth once daily.      omeprazole (PriLOSEC) 20 mg DR capsule Take 1 capsule (20 mg) by mouth once daily.       No current facility-administered medications for this visit.       PAST HISTORY     PERTINENT PAST MEDICAL HISTORY: See HPI    PERTINENT PAST SURGICAL HISTORY for Sleep Medicine:  non-contributory    PERTINENT FAMILY HISTORY for Sleep Medicine:  Patient denies family history of any sleep disorder.  Patient denies family history of sleep apnea.    PERTINENT SOCIAL  "HISTORY:  He  reports that he quit smoking about 31 years ago. His smoking use included cigarettes. He started smoking about 55 years ago. He has a 52.50 pack-year smoking history. He has quit using smokeless tobacco. He reports that he does not currently use alcohol. He reports that he does not currently use drugs. He currently lives with family and retired    Active Problems, Allergy List, Medication List, and PMH/PSH/FH/Social Hx have been reviewed and reconciled in chart. No significant changes unless documented in the pertinent chart section. Updates made when necessary.     PHYSICAL EXAM     VITAL SIGNS: /72   Pulse (!) 48   Wt 126 kg (277 lb 3.2 oz)   SpO2 92%   BMI 40.94 kg/m²     CURRENT WEIGHT:   Vitals:    05/01/24 0907   Weight: 126 kg (277 lb 3.2 oz)      BMI: Body mass index is 40.94 kg/m².     PREVIOUS WEIGHTS:  Wt Readings from Last 3 Encounters:   05/01/24 126 kg (277 lb 3.2 oz)   03/27/24 126 kg (278 lb)   03/13/24 127 kg (279 lb)       Today ESS: 5  Today KIMBERLEY: 9  Today SAPNA-7: 6   Today PHQ-2: POSITIVE  little interest or pleasure = 1  down, depressed or hopeless = 1    PHYSICAL EXAMINATION  General appearance: awake alert in NAD  Affect: normal  Skin: no rash noted to face  HEENT: Nasal congestion absent  Teeth: normal dentition  Lungs: no cough.  Extr: moves all four extremities  Neuro: normal speech        RESULTS/DATA     No results found for: \"IRON\", \"TRANSFERRIN\", \"IRONSAT\", \"TIBC\", \"FERRITIN\"    No results found for: \"CO2\"    PAP Adherence  Not applicable    ASSESSMENT/PLAN     Assessment/Plan   Faisal Jose is a 68 y.o. male presents today in [facility] with the following problems:    CURRENT DME: [facility]    SLEEP DISORDERED BREATHING/SUSPECTED SLEEP APNEA and SNORING,   - Patient's risk factors for BRENNAN: BMI, age, neck circumference, gender, HTN, asthma, and narrow crowded upper airway anatomy  - Current symptoms are: see HPI  - We discussed " testing options today in clinic, HSAT vs In-lab  - HSAT is reasonable as patient likely has BRENNAN based on history and exam and does not have any of the following comorbidities: Afib, CHF, seizures, neuromuscular weakness, hypoventilation, or significant COPD.  - Discussed BRENNAN pathophysiology, diagnostic testing (HST vs PSG), cardiometabolic and neurocognitive sequelae of untreated BRENNAN, and treatment options (PAP therapy, oral appliance, surgery, hypoglossal nerve stimulator called INSPIRE, etc).    05/01/24  - previously ordered HSAT, is not scheduled for testing till May 13th  - encouraged patient to complete sleep study, will call with results and further management    POOR SLEEP HYGIENE / EXCESSIVE DAYTIME SLEEPINESS (EDS) / FATIGUE  + SLEEP DISTURBANCES + HYPERSOMNIA  - due to combination of poor sleep hygiene, anxiety, untreated sleep apnea, chronic pain, irregular sleep schedule and environment. Per review of medication list, it does NOT appear medications are a contributing factor.  - discussed with patient good sleep hygiene  - will reevaluate after successful testing and treatment of BRENNAN  05/01/24  - no significant changes noted today      MORBID OBESITY  - BMI today Body mass index is 40.94 kg/m².   - no significant weight changes noted or reported  - Encouraged patient to lose weight with diet and exercise.   - Weight loss can help in the long term treatment of BRENNAN.  - defer management to PCP       HYPERTENSION  - BP today 123/72  - well controlled with medication, denies any issues with management    - encouraged daily exercise with healthy diet for BP and BRENNAN management  - on meds per PCP  - Defer management to PCP    DEPRESSION / ANXIETY screen  DEPRESSION  / ANXIETY   + screens  - denies any SI, HI or hallucinations   - not currently on medications  - defer management to PCP      SMOKING STATUS screen  - former smoker     All of patient's questions were answered. He verbalizes understanding and  agreement with my assessment and plan.

## 2024-05-01 NOTE — PATIENT INSTRUCTIONS
It was a pleasure meeting you today Faisal Jose     CURRENT DME: [facility]    As we discussed today in clinic:    1. Do in-home sleep testing.  Keep your appt on May 13th for your sleep study.  2. Encouraged to lose weight.   3. Remember, don't drive when sleepy.   4. We will call you with results and discuss further POC. If moderate-severe, we will start CPAP therapy, if mild, we can discuss alternative therapy  5. I recommend talking with a grief counselor, if you need anything, please reach out to the clinic and we can get you help to cope with your loss       As a general guideline, please replace your: PAP cushions every 2-4 weeks, mask every 3-6 months, hose every 3-6 months, Filter (disposable) every 2-4 weeks; machine may need replacement in 5-10 years (once they stop working).     Please follow-up in 4 - 6 weeks after testing    ALWAYS BRING YOUR CPAP / BIPAP WITH YOU TO EVERY APPOINTMENT!  THANKS    FOR QUESTIONS AND CONCERNS:   1. In case of problems with machine or mask interface, please contact your DME company first. Identify is the company that provides you the machine and/or CPAP supplies. If [employer] if your DME, you can reach them at [phone] or [employer] ([employer]) [phone].  2. For SLEEP STUDY appointments, please call [phone] ([facility]) or [phone] / [phone] ([facility]) or any other  Sleep Lab location please call [phone]  3. For MEDICAL QUESTIONS, MEDICATION REFILLS, or CLINIC APPOINTMENT SCHEDULING, please call [phone] and my practice lead Susie would gladly assist you with any concerns.   4. In the event that you are running more than 10 minutes late to your appointment or 5 minutes to virtual, I will kindly ask you to reschedule.    Here at [facility], we wish you a restful sleep!

## 2024-05-13 ENCOUNTER — PROCEDURE VISIT (OUTPATIENT)
Dept: SLEEP MEDICINE | Facility: CLINIC | Age: 69
End: 2024-05-13
Payer: COMMERCIAL

## 2024-05-13 VITALS — HEART RATE: 52 BPM | SYSTOLIC BLOOD PRESSURE: 111 MMHG | DIASTOLIC BLOOD PRESSURE: 73 MMHG

## 2024-05-13 DIAGNOSIS — R06.83 SNORING: ICD-10-CM

## 2024-05-13 DIAGNOSIS — R29.818 SUSPECTED SLEEP APNEA: ICD-10-CM

## 2024-05-13 DIAGNOSIS — G47.9 SLEEP DISTURBANCES: ICD-10-CM

## 2024-05-13 DIAGNOSIS — G47.30 SLEEP-DISORDERED BREATHING: ICD-10-CM

## 2024-05-13 NOTE — PROGRESS NOTES
The patient received equipment and instructions for use of the [employer] [facility] Nom[facility] HSAT device. The patient was instructed how to apply the effort belts, cannula, thermistor. It was also explained how the Nomad and oximeter components work.  The patient was asked to record their sleep for an 8-hour period.     The patient was informed of their responsibility for the device and acknowledged this by signing the HSAT device contract. The patient was asked to return the device on 5/14/2024 to [employer]'s .     The patient was instructed to call 911 as usual for any medical- emergencies while at home.  The patient was also given a phone number for troubleshooting when using the device in case there were additional questions.    Neck circumference 17 inches    no fever and no chills.

## 2024-05-14 PROBLEM — Z87.891 FORMER SMOKER: Status: ACTIVE | Noted: 2024-05-14

## 2024-05-14 PROBLEM — F41.9 ANXIETY: Status: ACTIVE | Noted: 2024-05-14

## 2024-05-14 PROBLEM — R45.89 SYMPTOMS OF DEPRESSION: Status: ACTIVE | Noted: 2024-05-14

## 2024-05-15 DIAGNOSIS — E66.01 MORBID OBESITY (MULTI): ICD-10-CM

## 2024-05-15 DIAGNOSIS — R06.83 SNORING: ICD-10-CM

## 2024-05-15 DIAGNOSIS — R29.818 SUSPECTED SLEEP APNEA: ICD-10-CM

## 2024-05-15 DIAGNOSIS — G47.9 SLEEP DISTURBANCES: ICD-10-CM

## 2024-05-15 DIAGNOSIS — G47.30 SLEEP-DISORDERED BREATHING: Primary | ICD-10-CM
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
## 2024-06-12 ENCOUNTER — OFFICE VISIT (OUTPATIENT)
Dept: PULMONOLOGY | Facility: CLINIC | Age: 69
End: 2024-06-12
Payer: COMMERCIAL

## 2024-06-12 VITALS
DIASTOLIC BLOOD PRESSURE: 74 MMHG | HEIGHT: 69 IN | HEART RATE: 53 BPM | WEIGHT: 274 LBS | OXYGEN SATURATION: 95 % | BODY MASS INDEX: 40.58 KG/M2 | SYSTOLIC BLOOD PRESSURE: 131 MMHG

## 2024-06-12 DIAGNOSIS — Z87.891 FORMER CIGARETTE SMOKER: ICD-10-CM

## 2024-06-12 DIAGNOSIS — J98.4 RESTRICTIVE LUNG DISEASE: ICD-10-CM

## 2024-06-12 DIAGNOSIS — K21.9 GASTROESOPHAGEAL REFLUX DISEASE, UNSPECIFIED WHETHER ESOPHAGITIS PRESENT: ICD-10-CM

## 2024-06-12 DIAGNOSIS — R29.818 SUSPECTED SLEEP APNEA: ICD-10-CM

## 2024-06-12 DIAGNOSIS — J45.20 MILD INTERMITTENT ASTHMA WITHOUT COMPLICATION (HHS-HCC): Primary | ICD-10-CM

## 2024-06-12 PROCEDURE — 99214 OFFICE O/P EST MOD 30 MIN: CPT | Performed by: NURSE PRACTITIONER

## 2024-06-12 PROCEDURE — 3008F BODY MASS INDEX DOCD: CPT | Performed by: NURSE PRACTITIONER

## 2024-06-12 PROCEDURE — 1036F TOBACCO NON-USER: CPT | Performed by: NURSE PRACTITIONER

## 2024-06-12 PROCEDURE — 1126F AMNT PAIN NOTED NONE PRSNT: CPT | Performed by: NURSE PRACTITIONER

## 2024-06-12 PROCEDURE — 1159F MED LIST DOCD IN RCRD: CPT | Performed by: NURSE PRACTITIONER

## 2024-06-12 PROCEDURE — 1160F RVW MEDS BY RX/DR IN RCRD: CPT | Performed by: NURSE PRACTITIONER

## 2024-06-12 ASSESSMENT — ENCOUNTER SYMPTOMS
NAUSEA: 0
EYE ITCHING: 0
NERVOUS/ANXIOUS: 0
STRIDOR: 0
RHINORRHEA: 0
SHORTNESS OF BREATH: 0
ROS GI COMMENTS: DENIES ACID REFLUX
ARTHRALGIAS: 0
APPETITE CHANGE: 0
TROUBLE SWALLOWING: 0
FATIGUE: 0
CHEST TIGHTNESS: 0
SLEEP DISTURBANCE: 1
VOMITING: 0
SORE THROAT: 0
AGITATION: 0
BACK PAIN: 0
JOINT SWELLING: 0
EYE REDNESS: 0
SINUS PAIN: 0
WEAKNESS: 0
WHEEZING: 1
ACTIVITY CHANGE: 0
DIARRHEA: 0
TREMORS: 0
CHILLS: 0
DIZZINESS: 0
COUGH: 0
ABDOMINAL PAIN: 0
PALPITATIONS: 0
VOICE CHANGE: 0
FEVER: 0
MYALGIAS: 0
BRUISES/BLEEDS EASILY: 0
HEADACHES: 1
SINUS PRESSURE: 0
UNEXPECTED WEIGHT CHANGE: 0

## 2024-06-12 ASSESSMENT — LIFESTYLE VARIABLES: HOW MANY STANDARD DRINKS CONTAINING ALCOHOL DO YOU HAVE ON A TYPICAL DAY: PATIENT DOES NOT DRINK

## 2024-06-12 ASSESSMENT — PATIENT HEALTH QUESTIONNAIRE - PHQ9
1. LITTLE INTEREST OR PLEASURE IN DOING THINGS: NOT AT ALL
SUM OF ALL RESPONSES TO PHQ9 QUESTIONS 1 & 2: 0
2. FEELING DOWN, DEPRESSED OR HOPELESS: NOT AT ALL

## 2024-06-12 ASSESSMENT — PAIN SCALES - GENERAL: PAINLEVEL: 0-NO PAIN

## 2024-06-12 NOTE — PATIENT INSTRUCTIONS
"Today we discussed your test results and how you have been feeling.    -Moving forward I would like for you to monitor the frequency of use and need of your albuterol inhaler.  If you feel like you are needing albuterol most days of the week/multiple times a day; this is a good indication that your asthma is likely not under good control and we will have to consider going back on Breo for at least 1 month to get you over that asthma flare.  In the meantime, I would continue using your albuterol when you feel like you need it and feel free to reach out if you have any questions or concerns about your asthma plan moving forward.  -No evidence of interstitial lung disease on recent chest CT  -Can stop daily use of Breo; will keep handy for a later time if you need to go back on it.  -Continue Albuterol Inhaler; 2 puffs every 4-6 hours as needed for shortness of breath. You can also take this 10-15 minutes prior to exertional activity to help \"prime\" your lungs.  -Continue following with sleep medicine for concerns of sleep apnea  -Continue omeprazole daily (can consider doing 20 mg twice a day if you are having breakthrough flares of GERD)    Thank you for visiting the pulmonary clinic today! It was a pleasure to participate in your care.  Please return to clinic 1 year or sooner if needed.    Derik Moise, CNP  My Office Number: [phone]   CT Scheduling: [phone]  PFT/Follow Up Visit Scheduling: [phone]  My Nurse: PETROS Khan  My [place]: Criss    To reach the nurse, Erin Becerra RN, please call ([phone]) [employer] has a secure voice mail account if you want to leave a message.    **For immediate needs such as medication issues/refills, active sick symptoms/medical concerns; I ask that you please call the office and speak to the pulmonary nurse. MyChart messages do not come directly to me. There can sometimes be a delay before I am aware of any messages that were sent. Thank you.**      "

## 2025-06-11 ENCOUNTER — APPOINTMENT (OUTPATIENT)
Dept: PULMONOLOGY | Facility: CLINIC | Age: 70
End: 2025-06-11
Payer: MEDICARE